# Patient Record
Sex: MALE | Race: WHITE | NOT HISPANIC OR LATINO | Employment: FULL TIME | URBAN - METROPOLITAN AREA
[De-identification: names, ages, dates, MRNs, and addresses within clinical notes are randomized per-mention and may not be internally consistent; named-entity substitution may affect disease eponyms.]

---

## 2020-09-03 ENCOUNTER — TELEMEDICINE (OUTPATIENT)
Dept: BEHAVIORAL HEALTH | Facility: CLINIC | Age: 25
End: 2020-09-03
Payer: COMMERCIAL

## 2020-09-03 DIAGNOSIS — F33.1 MODERATE EPISODE OF RECURRENT MAJOR DEPRESSIVE DISORDER (HCC): ICD-10-CM

## 2020-09-03 DIAGNOSIS — F10.10 ALCOHOL ABUSE: ICD-10-CM

## 2020-09-03 PROCEDURE — 90791 PSYCH DIAGNOSTIC EVALUATION: CPT | Performed by: PSYCHOLOGIST

## 2020-09-03 SDOH — HEALTH STABILITY: MENTAL HEALTH: HOW MANY STANDARD DRINKS CONTAINING ALCOHOL DO YOU HAVE ON A TYPICAL DAY?: 7 TO 9

## 2020-09-03 SDOH — HEALTH STABILITY: MENTAL HEALTH: HOW OFTEN DO YOU HAVE A DRINK CONTAINING ALCOHOL?: 4 OR MORE TIMES A WEEK

## 2020-09-03 NOTE — BH THERAPY
[facility]  INITIAL ASSESSMENT    Name: Aniket Hernández  MRN: [id number]  : 1995  Age: 25 y.o.  Date of assessment: 9/3/2020  PCP: Pcp Pt States None  Persons in attendance: Patient  Total session time: 45 minutes    This evaluation was conducted via Zoom using secure and encrypted videoconferencing technology. (Virtual). Verbal consent was obtained. Patient's identity was verified.       CHIEF COMPLAINT AND HISTORY OF PRESENTING PROBLEM:  (as stated by Patient):  Aniket Hernández is a 25 year old white male self-referred for assessment and treatment for depression, alcohol abuse, and marital problems. Patient reported that he has been drinking excessively since he was a teenager. The past several months it has been causing significant marital problems. He does recognize that his alcohol use has been his form of self-medication. He has been drinking to help him fall asleep. Reviewed the [facility] policies and procedures. Also explained the limitations of confidentiality.    Primary presenting issue includes   Chief Complaint   Patient presents with   • Stress   • Sleep Problem   • Addiction Problem     alcohol abuse   • Initial  Evaluation   • Depression   • Anxiety       FAMILY/SOCIAL HISTORY  Current living situation/household members: Patient currently lives with his wife of five years. Also living with patient and his wife are his two children.  Relevant family history/structure/dynamics: Both parents are alive and remain . He has one brother and one sister.   Current family/social stressors: Patient stated that there is currently significant marital conflict.  Quality/quantity of current family and/or social support: Patient's best support system is his wife.  Does patient/parent report a family history of behavioral health issues, diagnoses, or treatment? Yes  Family History   Problem Relation Age of Onset   • Dementia Mother    • Alcohol abuse Father         BEHAVIORAL  HEALTH TREATMENT HISTORY  Does patient/parent report a history of prior behavioral health treatment for patient? No:Patient did attend marriage counseling with his wife about five years ago, but stated that it was not effective.    History of untreated behavioral health issues identified? No    MEDICAL HISTORY  Past medical/surgical history:   Past Medical History:   Diagnosis Date   • Alcohol abuse    • Anxiety    • Depression       No past surgical history on file.     Medication Allergies:  Patient has no allergy information on record.   Medical history provided by patient during current evaluation: Patient denied any significant medical history.    Patient reports last physical exam: Patient does not remember  Does patient/parent report any history of or current developmental concerns? No  Does patient/parent report nutritional concerns? No  Does patient/parent report change in appetite or weight loss/gain? No  Does patient/parent report history of eating disorder symptoms? No  Does patient/parent report dental problem? No  Does patient/parent report physical pain? No   Indicate if pain is acute or chronic, and location: N/A   Pain scale rating:       Does patient/parent report functional impact of medical, developmental, or pain issues?   N\A    EDUCATIONAL/LEARNING HISTORY  Is patient currently enrolled in a school/educational program? 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 Yes  Does patient identify impact of presenting issue on work functioning? Yes  Work or income-related stressors:  None      HISTORY:  Does patient report current or past enlistment? No    [If yes, complete below items]  Does patient report history of exposure to combat? No  Does patient report history of  sexual trauma? No  Does patient report other -related stressors? No    SPIRITUAL/CULTURAL/IDENTITY:  What are the patient’s/family’s spiritual beliefs or practices? agnostic  What is the patient’s cultural or ethnic background/identity?   How does the patient identify their sexual orientation? heterosexual  How does the patient identify their gender? male  Does the patient identify any spiritual/cultural/identity factors as relevant to the presenting issue? No    LEGAL HISTORY  Has the patient ever been involved with juvenile, adult, or family legal systems? No   [If yes, trigger section below:]  Does patient report ever being a victim of a crime?  No  Does patient report involvement in any current legal issues?  No  Does patient report ever being arrested or committing a crime? No  Does patient report any current agency (parole/probation/CPS/) involvement? No    ABUSE/NEGLECT/TRAUMA SCREENING  Does patient report feeling “unsafe” in his/her home, or afraid of anyone? No  Does patient report any history of physical, sexual, or emotional abuse? No  Does parent or significant other report any of the above? No  Is there evidence of neglect by self? No  Is there evidence of neglect by a caregiver? No  Does the patient/parent report any history of CPS/APS/police involvement related to suspected abuse/neglect or domestic violence? No  Does the patient/parent report any other history of potentially traumatic life events? No  Based on the information provided during the current assessment, is a mandated report of suspected abuse/neglect being made?  No     SAFETY ASSESSMENT - SELF  Does patient acknowledge current or past symptoms of dangerousness to self? No, but stated that he did have some passive  thoughts a few years ago.  Does parent/significant other report patient has current or past symptoms of dangerousness to self? No      Recent change in frequency/specificity/intensity of suicidal thoughts or self-harm behavior? No  Current access to firearms, medications, or other identified means of suicide/self-harm? Yes  If yes, willing to restrict access to means of suicide/self-harm? not indicated  Protective factors present: Fear of suicide, Future-oriented, Good impulse control, Hopefulness, Optimism, Positive coping skills, Positive self-efficacy and Strong family connections    Current Suicide Risk: Low  Crisis Safety Plan completed and copy given to patient: No, but reviewed safety plan with patient.    SAFETY ASSESSMENT - OTHERS  Does paor past symptoms of aggressive behavior or risk to others? No  Does parent/significant othtient acknowledge current or past symptoms of aggressive behavior or risk to others? No  Does parent/significant other report patient has current or past symptoms of aggressive behavior or risk to others? No    Recent change in frequency/specificity/intensity of thoughts or threats to harm others? No  Current access to firearms/other identified means of harm? Yes  If yes, willing to restrict access to weapons/means of harm? not indicated  Protective factors present: Good frustration tolerance, Fear of consequences, Moral/spiritual prohibition, Guilt/remorse for past aggression, Well-developed sense of empathy, Positive impulse-control, Stable relationships and Stable employment    Current Homicide Risk:  Low  Crisis Safety Plan completed and copy given to patient? No  Based on information provided during the current assessment, is a mandated “duty to warn” being exercised? No    SUBSTANCE USE/ADDICTION HISTORY  [] Not applicable - patient 10 years of age or younger    Is there a family history of substance use/addiction? Yes  Does patient acknowledge or parent/significant other report  use of/dependence on substances? Yes  Last time patient used alcohol: yesterday  Within the past week? Yes  Last time patient used marijuana: denied  Within the past month? No  Any other street drugs ever tried even once? Yes  Any use of prescription medications/pills without a prescription, or for reasons others than originally prescribed?  No  Any other addictive behavior reported (gambling, shopping, sex)? No     Drug History:  Amphetamine:  Amphetamine frequency: Never used      Cannibis:  Cannabis frequency: Past occasional use  Cannabis last use: 11/3/10    Cocaine:  Cocaine frequency: Never used    Ecstasy:  Ecstasy frequency: Never used    Hallucinogen:  Hallucinogen frequency: Never used    Inhalant:   Inhalant frequency: Never used    Opiate:  Opiate frequency: Never used  Cannabis frequency: Past occasional use  Cannabis last use: 11/3/10    Other:  Other drug frequency: Never used    Sedative:   Sedative frequency: Never used      What consequences does the patient associate with any of the above substance use and or addictive behaviors? Relationship problems: significant conflict with wife    Patient’s motivation/readiness for change: marriage and potential work problems    [] Patient denies use of any substance/addictive behaviors    STRENGTHS/ASSETS  Strengths Identified by interviewer: Insight into problems, Evidence of good judgement, Self-awareness, Family suppport, Social support, Stable relationships, Cognitive flexibility and Social skills  Strengths Identified by patient: motivated for treatment, good support system    MENTAL STATUS/OBSERVATIONS   Participation: Active verbal participation, Attentive, Engaged and Open to feedback  Grooming: Casual and Neat  Orientation:Alert and Fully Oriented   Behavior: Calm  Eye contact: Good   Mood:Depressed and Anxious  Affect:Flexible and Congruent with content  Thought process: Logical and Goal-directed  Thought content:  Within normal limits  Speech:  Rate within normal limits and Volume within normal limits  Perception: Within normal limits  Memory: No gross evidence of memory deficits  Insight: Good  Judgment:  Good  Other:    Family/couple interaction observations: N/A        CLINICAL FORMULATION:   Aniket Hernández is a 25 year old white male self-referred for assessment and treatment for depression, alcohol abuse, and marital problems. Patient reported that he has been drinking excessively since he was a teenager. The past several months it has been causing significant marital problems. He does recognize that his alcohol use has been his form of self-medication. He has been drinking to help him fall asleep. Talked with patient about his addiction to alcohol and some treatment options. Patient wants to try to handle it on his own, but is willing to look at other options if he is not successful. He stated that his wife is a good support system and is willing to be supportive. Encouraged patient to attend some A.A. meetings, which he is reluctant.    Patient did not present in acute distress. Patient was appropriately groomed and cooperative. Patient was alert and oriented to person, place, and time. Eye contact was good. No abnormalities in attention or concentration were noted. No abnormalities of movement present; psychomotor activity was normal. Speech was fluent and regular in rhythm, rate, volume, and tone. Thought processes were linear, logical, and goal-directed. There was no evidence of thought disorder. No auditory or visual hallucinations. Long and short term memory appeared to be intact. Insight and judgment appeared appropriate. Impulse control was deemed to be within normal limits. Reported mood was fairly positive. Affect was appropriate and congruent with thought content and conversation. Patient denied current suicidal and homicidal ideation or plan, intent, and preparatory behavior.       DIAGNOSTIC IMPRESSION(S):  1. Moderate episode of  recurrent major depressive disorder (HCC)    2. Alcohol abuse        IDENTIFIED NEEDS/PLAN:  [If any of these marked, trigger DISPOSITION list]  Mood/anxiety and Substance use/Addictive behavior  Actively being addressed by [facility]     PLAN/DISPOSITION:     1. The patient will return to the clinic in one to two weeks..  2. Crisis Response Plan:  Reviewed emergency resources with the patient and the patient expressed understanding including:  If feeling suicidal, patient will call or present to the [facility] during duty hours or present to closest ED ([facility] or [facility], call 911 or crisis hotline ([phone]-MJWA) after duty hours.  3. Referrals/Consults:  N/A  4. Barriers to Learning:  No  5. Readiness to Learn:  Yes  6. Cultural Concerns:  No  7. Patient voiced understanding of, and agreement with, plan and goals as annotated above.  8. Declare these services are medically necessary and appropriate to the patient’s diagnosis and needs  9. The point of contact at the [facility] regarding this evaluation is Dr. Valentine, Psychologist.    Does patient express agreement with the above plan? Yes     Referral appointment(s) scheduled? No       Vamsi Valentine, Ph.D.

## 2020-09-10 ENCOUNTER — TELEMEDICINE (OUTPATIENT)
Dept: BEHAVIORAL HEALTH | Facility: CLINIC | Age: 25
End: 2020-09-10
Payer: COMMERCIAL

## 2020-09-10 DIAGNOSIS — F10.10 ALCOHOL ABUSE: ICD-10-CM

## 2020-09-10 DIAGNOSIS — F33.1 MODERATE EPISODE OF RECURRENT MAJOR DEPRESSIVE DISORDER (HCC): ICD-10-CM

## 2020-09-10 PROCEDURE — 90834 PSYTX W PT 45 MINUTES: CPT | Mod: 95,CR | Performed by: PSYCHOLOGIST

## 2020-09-10 NOTE — BH THERAPY
[facility]  Therapy Progress Note    Patient Name: Aniket Hernández  Patient MRN: [id number]  Today's Date: 9/10/2020     Type of session:Individual psychotherapy  Length of session: 45 minutes  Persons in attendance:Patient     This evaluation was conducted via Zoom using secure and encrypted videoconferencing (virtual) technology. The patient was in a private location in the [facility]. Verbal consent was obtained. Patient's identity was verified.    Subjective/New Info:   Aniket Hernández is a 25 year old white male self-referred for assessment and treatment for depression, alcohol abuse, and marital problems. Patient reported that he has not consumed any alcohol since 9/3/2020. He stated that he has been missing it on occasion, especially when he is stressed. Patient is concerned that he would be very anxious in social situations. Talked with patient about how he might increase his comfort level in social situations. Encouraged patient to avoid people, places, and things associated with alcohol. He does recognize that he does self-medicate with alcohol. Talked with patient about the fist step of the twelve step program. Encouraged patient to attend A.A. but he prefers not to do that at this time. Also encouraged patient to take one day at a time. Talked with patient about some things that he might do to reduce his stress. He is also focused on improving his marriage, which was impacted by his alcohol consumption.      Patient did not present in acute distress. Patient was appropriately groomed and cooperative. Patient was alert and oriented to person, place, and time. Eye contact was good. No abnormalities in attention or concentration were noted. No abnormalities of movement present; psychomotor activity was normal. Speech was fluent and regular in rhythm, rate, volume, and tone. Thought processes were linear, logical, and goal-directed. There was no evidence of thought disorder. No auditory or  visual hallucinations. Long and short term memory appeared to be intact. Insight and judgment appeared appropriate. Impulse control was deemed to be within normal limits. Reported mood was fairly positive. Affect was appropriate and congruent with thought content and conversation. Patient denied current suicidal and homicidal ideation or plan, intent, and preparatory behavior.     Objective/Observations:   Participation: Active verbal participation, Verbally monopolizing and Attentive   Grooming: Casual and Neat   Cognition: Alert and Fully Oriented   Eye contact: Good   Mood: Depressed   Affect: Flexible and Congruent with content   Thought process: Logical and Goal-directed   Speech: Rate within normal limits and Volume within normal limits   Other:     Diagnoses:   1. Moderate episode of recurrent major depressive disorder (HCC)    2. Alcohol abuse         Current risk:   SUICIDE: Low   Homicide: Low   Self-harm: Low   Relapse: Moderate   Other:    Safety Plan reviewed? Not Indicated   If evidence of imminent risk is present, intervention/plan:     Therapeutic Intervention(s): Cognitive modification, Develop/modify treatment plan, Distress tolerance skills, Leisure and recreation skills, Stressors assessed and Supportive psychotherapy    Treatment Goal(s)/Objective(s) addressed:   Reduce symptoms of Depression:  Objective A: Patient will increase activity level by will participating in at least two hours, three times per week in a social or leisure activity with family member or close friend.  Objective B: Patient will express an increase in positive statements about self by  will making at least five positive statements per day about self circumstances.  Objective C: Patient will spend more time with friend in social situations by  spending at least thirty minutes four times per week in a social situation, interacting appropriately with a friend.    Alcohol/Drugs and Other Addictions   Goal: Be free of  drug/alcohol use/abuse   Avoid people, places and situations where temptation might be overwhelming   Explore dynamics relating to being the [child//wife] of an [alcoholic/addict] and discuss them each week at support group meetings   Learn five triggers for alcohol & drug use   Reach ____ days/months/years of clean/sober living          Progress toward Treatment Goals: Mild improvement    Plan:  - Continue Individual therapy    Vamsi Valentine, Ph.D.  9/10/2020

## 2020-10-01 ENCOUNTER — APPOINTMENT (OUTPATIENT)
Dept: BEHAVIORAL HEALTH | Facility: CLINIC | Age: 25
End: 2020-10-01
Payer: COMMERCIAL

## 2020-10-08 ENCOUNTER — TELEMEDICINE (OUTPATIENT)
Dept: BEHAVIORAL HEALTH | Facility: CLINIC | Age: 25
End: 2020-10-08
Payer: COMMERCIAL

## 2020-10-08 DIAGNOSIS — F33.1 MODERATE EPISODE OF RECURRENT MAJOR DEPRESSIVE DISORDER (HCC): ICD-10-CM

## 2020-10-08 DIAGNOSIS — F10.10 ALCOHOL ABUSE: ICD-10-CM

## 2020-10-08 PROCEDURE — 90834 PSYTX W PT 45 MINUTES: CPT | Mod: 95,CR | Performed by: PSYCHOLOGIST

## 2020-10-08 NOTE — BH THERAPY
[facility]  Therapy Progress Note    Patient Name: Aniket Hernández  Patient MRN: [id number]  Today's Date: 10/8/2020     Type of session:Individual psychotherapy  Length of session: 45 minutes  Persons in attendance:Patient        This evaluation was conducted via Zoom using secure and encrypted videoconferencing (virtual) technology. The patient was in a private location in the [facility]. Verbal consent was obtained. Patient's identity was verified.    Subjective/New Info:   Aniket Hernández is a 25 year old white male self-referred for assessment and treatment for depression, alcohol abuse, and marital problems. Patient stated that he continues to be alcohol free. He and his wife have been having some conflict but he hopeful that things will improve with their relationship. Talked with patient about the importance of focusing on his marriage and identifying some things that they enjoy doing together. Discussed some things that he might do for his wife that will demonstrate his love for her. Patient did express that he is disappointed because his wife has not noticed the changes in patient since he has stopped consuming alcohol. It does not sound as if patient and his wife do not do very much together.         Patient did not present in acute distress. Patient was appropriately groomed and cooperative. Patient was alert and oriented to person, place, and time. Eye contact was good. No abnormalities in attention or concentration were noted. No abnormalities of movement present; psychomotor activity was normal. Speech was fluent and regular in rhythm, rate, volume, and tone. Thought processes were linear, logical, and goal-directed. There was no evidence of thought disorder. No auditory or visual hallucinations. Long and short term memory appeared to be intact. Insight and judgment appeared appropriate. Impulse control was deemed to be within normal limits. Reported mood was fairly positive. Affect was  appropriate and congruent with thought content and conversation. Patient denied current suicidal and homicidal ideation or plan, intent, and preparatory behavior.      Objective/Observations:              Participation: Active verbal participation, Verbally monopolizing and Attentive              Grooming: Casual and Neat              Cognition: Alert and Fully Oriented              Eye contact: Good              Mood: Depressed              Affect: Flexible and Congruent with content              Thought process: Logical and Goal-directed              Speech: Rate within normal limits and Volume within normal limits      Diagnoses:   1. Moderate episode of recurrent major depressive disorder (HCC)    2. Alcohol abuse         Current risk:   SUICIDE: Low   Homicide: Low   Self-harm: Low   Relapse: Moderate   Other:    Safety Plan reviewed? Not Indicated   If evidence of imminent risk is present, intervention/plan:     Therapeutic Intervention(s): Cognitive modification, Distress tolerance skills, Leisure and recreation skills, Stressors assessed and Supportive psychotherapy    Treatment Goal(s)/Objective(s) addressed:   Reduce symptoms of Depression:  Objective A: Patient will increase activity level by will participating in at least two hours, three times per week in a social or leisure activity with family member or close friend.  Objective B: Patient will express an increase in positive statements about self by  will making at least five positive statements per day about self circumstances.  Objective C: Patient will spend more time with friend in social situations by  spending at least thirty minutes four times per week in a social situation, interacting appropriately with a friend.     Alcohol/Drugs and Other Addictions   Goal: Be free of drug/alcohol use/abuse   Avoid people, places and situations where temptation might be overwhelming   Explore dynamics relating to being the [child//wife] of an  [alcoholic/addict] and discuss them each week at support group meetings   Learn five triggers for alcohol & drug use   Reach thirty days of clean/sober living      Progress toward Treatment Goals: Mild improvement    Plan:  - Continue Individual therapy    Vamsi Valentine, Ph.D.  10/8/2020

## 2020-11-18 ENCOUNTER — OFFICE VISIT (OUTPATIENT)
Dept: BEHAVIORAL HEALTH | Facility: CLINIC | Age: 25
End: 2020-11-18
Payer: COMMERCIAL

## 2020-11-18 DIAGNOSIS — F43.23 ADJUSTMENT DISORDER WITH MIXED ANXIETY AND DEPRESSED MOOD: ICD-10-CM

## 2020-11-18 DIAGNOSIS — Z81.1: ICD-10-CM

## 2020-11-18 DIAGNOSIS — F10.930 ALCOHOL WITHDRAWAL SYNDROME WITHOUT COMPLICATION (HCC): ICD-10-CM

## 2020-11-18 PROBLEM — F10.939 ALCOHOL WITHDRAWAL (HCC): Status: ACTIVE | Noted: 2020-11-18

## 2020-11-18 PROCEDURE — 90791 PSYCH DIAGNOSTIC EVALUATION: CPT | Performed by: MARRIAGE & FAMILY THERAPIST
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
